# Patient Record
Sex: FEMALE | Race: WHITE
[De-identification: names, ages, dates, MRNs, and addresses within clinical notes are randomized per-mention and may not be internally consistent; named-entity substitution may affect disease eponyms.]

---

## 2018-10-03 ENCOUNTER — HOSPITAL ENCOUNTER (OUTPATIENT)
Dept: HOSPITAL 53 - M OPP | Age: 63
Discharge: HOME | End: 2018-10-03
Attending: INTERNAL MEDICINE
Payer: COMMERCIAL

## 2018-10-03 DIAGNOSIS — K62.1: ICD-10-CM

## 2018-10-03 DIAGNOSIS — Z79.899: ICD-10-CM

## 2018-10-03 DIAGNOSIS — Z12.11: Primary | ICD-10-CM

## 2018-10-03 DIAGNOSIS — F32.9: ICD-10-CM

## 2018-10-03 DIAGNOSIS — Z87.891: ICD-10-CM

## 2018-10-03 DIAGNOSIS — E11.9: ICD-10-CM

## 2018-10-03 DIAGNOSIS — Z79.4: ICD-10-CM

## 2018-10-03 DIAGNOSIS — Z88.1: ICD-10-CM

## 2018-10-03 DIAGNOSIS — I10: ICD-10-CM

## 2018-10-03 LAB — GLUCOSE BLDC GLUCOMTR-MCNC: 89 MG/DL (ref 80–115)

## 2018-10-03 PROCEDURE — 45380 COLONOSCOPY AND BIOPSY: CPT

## 2018-10-03 RX ADMIN — SODIUM CHLORIDE 1 MLS/HR: 9 INJECTION, SOLUTION INTRAVENOUS at 08:52

## 2019-03-11 ENCOUNTER — HOSPITAL ENCOUNTER (OUTPATIENT)
Dept: HOSPITAL 53 - M LAB REF | Age: 64
End: 2019-03-11
Attending: NURSE PRACTITIONER
Payer: COMMERCIAL

## 2019-03-11 DIAGNOSIS — E78.00: Primary | ICD-10-CM

## 2019-03-13 LAB — LDLC SERPL DIRECT ASSAY-MCNC: 112 MG/DL (ref 0–99)

## 2021-07-06 ENCOUNTER — HOSPITAL ENCOUNTER (EMERGENCY)
Dept: HOSPITAL 53 - M ED | Age: 66
Discharge: HOME | End: 2021-07-06
Payer: COMMERCIAL

## 2021-07-06 VITALS — BODY MASS INDEX: 32.28 KG/M2 | WEIGHT: 200.84 LBS | HEIGHT: 66 IN

## 2021-07-06 VITALS — SYSTOLIC BLOOD PRESSURE: 129 MMHG | DIASTOLIC BLOOD PRESSURE: 74 MMHG

## 2021-07-06 DIAGNOSIS — Z88.1: ICD-10-CM
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
## 2021-07-06 DIAGNOSIS — I10: ICD-10-CM
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
## 2021-07-06 DIAGNOSIS — S00.511A: ICD-10-CM

## 2021-07-06 DIAGNOSIS — S80.211A: ICD-10-CM

## 2021-07-06 DIAGNOSIS — Z88.8: ICD-10-CM
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
## 2021-07-06 DIAGNOSIS — Z79.4: ICD-10-CM

## 2021-07-06 DIAGNOSIS — S00.31XA: Primary | ICD-10-CM

## 2021-07-06 DIAGNOSIS — E11.9: ICD-10-CM

## 2021-07-06 DIAGNOSIS — Z79.899: ICD-10-CM

## 2021-07-06 NOTE — REP
INDICATION:

facial trauma.



COMPARISON:

None.



TECHNIQUE:

AP, lateral, flexion/extension, bilateral oblique, swimmer's and open mouth views of

the cervical spine.



FINDINGS:

Generalized age-related degenerative changes noted.  Alignment is maintained.  No

acute fracture/compression injury or subluxation.



IMPRESSION:

No acute fracture/compression injury or subluxation.





<Electronically signed by Al Auguste > 07/06/21 [id number]

## 2021-07-06 NOTE — REPVR
PROCEDURE INFORMATION: 

Exam: CT Maxillofacial Without Contrast 

Exam date and time: 7/6/2021 7:27 PM 

Age: 65 years old 

Clinical indication: Injury or trauma; Fall; Blunt trauma (contusions or 

hematomas); Lip/oral cavity; Upper; Additional info: Facial trauma 



TECHNIQUE: 

Imaging protocol: Computed tomography images of the face without contrast. 

Radiation optimization: All CT scans at this facility use at least one of these 

dose optimization techniques: automated exposure control; mA and/or kV 

adjustment per patient size (includes targeted exams where dose is matched to 

clinical indication); or iterative reconstruction. 



COMPARISON: 

CR Spine, Cervical 7/6/2021 7:09 PM 



FINDINGS: 

Orbital cavity: Orbits are normal. Globes are unremarkable. 

Bones/joints: The visualized cervical spine demonstrates moderate degenerative 

changes. 

Paranasal sinuses: Small retention cysts left maxillary sinus. 

Soft tissues: Soft tissue edema involving the upper lip consistent with recent 

trauma. 



Nasal cavity: Bilateral joan bullosa, right greater than left. 



IMPRESSION: 

Soft tissue edema involving the upper lip consistent with recent trauma. No 

fracture. 



Electronically signed by: Colin Rivera On 07/06/2021  20:19:58 PM

## 2021-07-06 NOTE — REPVR
PROCEDURE INFORMATION: 

Exam: CT Head Without Contrast 

Exam date and time: 7/6/2021 7:27 PM 

Age: 65 years old 

Clinical indication: Injury or trauma; Fall; Blunt trauma (contusions or 

hematomas); Additional info: Facial trauma 



TECHNIQUE: 

Imaging protocol: Computed tomography of the head without contrast. 

Radiation optimization: All CT scans at this facility use at least one of these 

dose optimization techniques: automated exposure control; mA and/or kV 

adjustment per patient size (includes targeted exams where dose is matched to 

clinical indication); or iterative reconstruction. 



COMPARISON: 

CR Spine, Cervical 7/6/2021 7:09 PM 



FINDINGS: 

Brain: There is mild parenchymal volume loss. Mild confluent white matter 

changes are demonstrated. 

Cerebral ventricles: The degree of ventricular dilatation is normal for age 

and/or degree of atrophy present. 

Paranasal sinuses: Visualized sinuses are unremarkable. No fluid levels. 

Mastoid air cells: Visualized mastoid air cells are well aerated. 

Bones/joints: There is hyperostosis frontalis interna. 

Soft tissues: Unremarkable. 



IMPRESSION: 

1. There is mild parenchymal volume loss. Mild confluent white matter changes 

are demonstrated. 

2. The degree of ventricular dilatation is normal for age and/or degree of 

atrophy present. 

3. No acute intracranial findings. 



Electronically signed by: Colin Rivera On 07/06/2021  20:15:15 PM

## 2025-04-03 ENCOUNTER — HOSPITAL ENCOUNTER (OUTPATIENT)
Dept: HOSPITAL 53 - M WUC | Age: 70
End: 2025-04-03
Attending: NURSE PRACTITIONER
Payer: COMMERCIAL

## 2025-04-03 DIAGNOSIS — M16.11: Primary | ICD-10-CM
